# Patient Record
(demographics unavailable — no encounter records)

---

## 2025-04-28 NOTE — HISTORY OF PRESENT ILLNESS
[FreeTextEntry1] : The patient is here from the [facility] with his aunt for evaluation of weakness which she noticed at age 18, characterized by difficulty of playing basketball and running.  He has had several falls.  He also has trouble lifting his arm past the shoulder.  He was previously able to do these activities without any problems.  He felt weakness when he was doing those activities.  There is no muscle pain or twitching.  No numbness and tingling.  He reached all of his motor and cognitive milestones appropriately. He does not have any difficulty speaking swallowing or breathing.  He has 1 and 2 half siblings.  One of the sibling is older and the other 2 half siblings are younger, no one is affected with any motor or neuromuscular disease.  His parents and grandparents, evaluated, and were healthy without any motor abnormalities or known neuromuscular disease.  The patient saw neurologist at [place], and has had extensive tests including genetic testing and has noted to be positive for SMA, SMN1: 0 copies, AR; SMN2: 4 copies.  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

## 2025-04-28 NOTE — ASSESSMENT
[FreeTextEntry1] : The patient has progressive weakness that started at age 18 with genetic testing conformatory of SMA, type 3 or 4 as per history and genetics as has 4 copies of SMN 2 and onset in late teens with trending more like SMA type 4.  NO known family history.  Will contact [address] [place] [address] to inquire about infusion treatments for SMA as I am unable to dispense at this location.  I spent the time noted on the day of this patient encounter preparing for, providing and documenting the above E/M service and counseling and educate patient on differential, workup, disease course, and treatment/management. Education was provided to the patient during this encounter. All questions and concerns were answered and addressed in detail. The patient verbalized understanding and agreed to plan. Patient was advised to continue to monitor for neurologic symptoms and to notify my office or go to the nearest emergency room if there are any changes. Any orders/referrals and communications were provided as well.   Side effects of the above medications were discussed in detail including but not limited to applicable black box warning and teratogenicity as appropriate.  For patients with seizures, I discussed risk of SUDEP with patient and advised that SUDEP risk can be minimized with good seizure control through medication compliance and other measures. Patient was advised to bring previous records to my office, including CD of imaging, when applicable.

## 2025-04-28 NOTE — ASSESSMENT
[FreeTextEntry1] : The patient has progressive weakness that started at age 18 with genetic testing conformatory of SMA, type 3 or 4 as per history and genetics as has 4 copies of SMN 2 and onset in late teens with trending more like SMA type 4.  NO known family history.  Will contact [address] [place] [address] to inquire about infusion treatments for SMA as I am unable to dispense at this location.  I spent the time noted on the day of this patient encounter preparing for, providing and documenting the above E/M service and counseling and educate patient on differential, workup, disease course, and treatment/management. Education was provided to the patient during this encounter. All questions and concerns were answered and addressed in detail. The patient verbalized understanding and agreed to plan. Patient was advised to continue to monitor for neurologic symptoms and to notify my office or go to the nearest emergency room if there are any changes. Any orders/referrals and communications were provided as well.   Side effects of the above medications were discussed in detail including but not limited to applicable black box warning and teratogenicity as appropriate.  For patients with seizures, I discussed risk of SUDEP with patient and advised that SUDEP risk can be minimized with good seizure control through medication compliance and other measures. Patient was advised to bring previous records to my office, including CD of imaging, when applicable.

## 2025-04-28 NOTE — HISTORY OF PRESENT ILLNESS
[FreeTextEntry1] : The patient is here from the [facility] with his aunt for evaluation of weakness which she noticed at age 18, characterized by difficulty of playing basketball and running.  He has had several falls.  He also has trouble lifting his arm past the shoulder.  He was previously able to do these activities without any problems.  He felt weakness when he was doing those activities.  There is no muscle pain or twitching.  No numbness and tingling.  He reached all of his motor and cognitive milestones appropriately. He does not have any difficulty speaking swallowing or breathing.  He has 1 and 2 half siblings.  One of the sibling is older and the other 2 half siblings are younger, no one is affected with any motor or neuromuscular disease.  His parents and grandparents, evaluated, and were healthy without any motor abnormalities or known neuromuscular disease.  The patient saw neurologist at [place], and has had extensive tests including genetic testing and has noted to be positive for SMA, SMN1: 0 copies, AR; SMN2: 4 copies.  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

## 2025-04-28 NOTE — DATA REVIEWED
[de-identified] : TSh, T4 normal Iron studies and TIBC normal B12 259 (nl), Vit D 25 hydroxy 20.5 (L).  CR low: 0.53 ESR 21 (H), CRP nl smoth muscle antibody normal  ***CPK 1901 (H)**** UA noted

## 2025-04-28 NOTE — PHYSICAL EXAM
[General Appearance - Alert] : alert [General Appearance - In No Acute Distress] : in no acute distress [Person] : oriented to person [Place] : oriented to place [Time] : oriented to time [Concentration Intact] : normal concentrating ability [Naming Objects] : no difficulty naming common objects [Fluency] : fluency intact [Comprehension] : comprehension intact [Vocabulary] : adequate range of vocabulary [Cranial Nerves Optic (II)] : visual acuity intact bilaterally,  visual fields full to confrontation, pupils equal round and reactive to light [Cranial Nerves Oculomotor (III)] : extraocular motion intact [Cranial Nerves Trigeminal (V)] : facial sensation intact symmetrically [Cranial Nerves Facial (VII)] : face symmetrical [Cranial Nerves Vestibulocochlear (VIII)] : hearing was intact bilaterally [Cranial Nerves Glossopharyngeal (IX)] : tongue and palate midline [Cranial Nerves Accessory (XI - Cranial And Spinal)] : head turning and shoulder shrug symmetric [Cranial Nerves Hypoglossal (XII)] : there was no tongue deviation with protrusion [Motor Tone] : muscle tone was normal in all four extremities [Involuntary Movements] : no involuntary movements were seen [No Muscle Atrophy] : normal bulk in all four extremities [Sensation Tactile Decrease] : light touch was intact [Sensation Pain / Temperature Decrease] : pain and temperature was intact [Sensation Vibration Decrease] : vibration was intact [Proprioception] : proprioception was intact [Abnormal Walk] : normal gait [Balance] : balance was intact [1+] : Ankle jerk left 1+ [Romberg's Sign] : Romberg's sign was negtive [Coordination - Dysmetria Impaired Finger-to-Nose Bilateral] : not present [Coordination - Dysmetria Impaired Heel-to-Shin Bilateral] : not present [Plantar Reflex Right Only] : normal on the right [Plantar Reflex Left Only] : normal on the left [FreeTextEntry5] : No twitches, no tongue atrophy, neck flexion and extension 5 out of 5 [FreeTextEntry6] : No muscle twitches  able to get up from chair without any difficulty And without requiring assistance. Proximal arm is 3/5 on the left and 4/5 right side and distal arm 5/5 bl arm; legs are 5/5 bl

## 2025-04-28 NOTE — REASON FOR VISIT
Impression: S/P Cataract Extraction by phacoemulsification with IOL placement; ORA; LRI (Limbal Relaxing Incision) OS - 8 Days. Presence of intraocular lens  Z96.1. Plan: RTC in 3 weeks for PO3. Continue combo drops following taper schedule. [Consultation] : a consultation visit [FreeTextEntry1] : weakness

## 2025-04-28 NOTE — DATA REVIEWED
[de-identified] : TSh, T4 normal Iron studies and TIBC normal B12 259 (nl), Vit D 25 hydroxy 20.5 (L).  CR low: 0.53 ESR 21 (H), CRP nl smoth muscle antibody normal  ***CPK 1901 (H)**** UA noted

## 2025-05-28 NOTE — HISTORY OF PRESENT ILLNESS
[FreeTextEntry1] : Verbal consent given on 05/28/2025 at 11:17 by COTY VILLAFUERTE, ~  ~. This is a 22-year-old male who is here from [place] with PMHx of spinal muscular dystrophy who was evaluated using video and audio technology for an evaluation. His weakness of muscles began at age of 18, where was not able to participate in several sports activities such as basketball and running. He also suffered several falls in the past. He reports difficulty with lifting items past his shoulder. He denies any muscle twitches, numbness and tingling.   He reached all of his motor and cognitive milestones appropriately. He does not have any difficulty speaking swallowing or breathing.  The patient saw neurologist at [place], and has had extensive tests including genetic testing and has noted to be positive for SMA, SMN1: 0 copies, AR; SMN2: 4 copies. 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

## 2025-05-28 NOTE — DISCUSSION/SUMMARY
[FreeTextEntry1] : Discussed with patient and his aunt, treatment for SMA will be initiated. Spinraza (nusinersen) is the first FDA-approved therapy to treat spinal muscular atrophy ([id number])[id number]. It is an SMN-enhancing therapy that targets the SMN2 gene to make more complete protein1.  The most common adverse reactions (20% of SPINRAZA-treated patients and 5% more frequently than in control patients) that occurred in the infantile-onset controlled study were lower respiratory infection and constipation. Serious adverse reactions of atelectasis were more frequent in SPINRAZA-treated patients (18%) than in control patients (10%). Because patients in this controlled study were infants, adverse reactions that are verbally reported could not be assessed. The most common adverse reactions that occurred in the later-onset controlled study were pyrexia, headache, vomiting, and back pain.  The recommended dosage is 12 mg (5 mL) per administration.  Initiate SPINRAZA treatment with 4 loading doses. The first three loading doses should be  administered at 14-day intervals. The 4th loading dose should be administered 30 days after the  3rd dose. A maintenance dose should be administered once every 4 months thereafter.

## 2025-05-28 NOTE — PHYSICAL EXAM
[FreeTextEntry1] : ALS FRS score - 37 [General Appearance - Alert] : alert [General Appearance - In No Acute Distress] : in no acute distress [Oriented To Time, Place, And Person] : oriented to person, place, and time [Impaired Insight] : insight and judgment were intact [Affect] : the affect was normal [Person] : oriented to person [Place] : oriented to place [Time] : oriented to time

## 2025-05-28 NOTE — REVIEW OF SYSTEMS
[Arm Weakness] : arm weakness [Hand Weakness] :  hand weakness [Negative] : Respiratory [Numbness] : no numbness [Tingling] : no tingling